# Patient Record
Sex: MALE | Race: WHITE
[De-identification: names, ages, dates, MRNs, and addresses within clinical notes are randomized per-mention and may not be internally consistent; named-entity substitution may affect disease eponyms.]

---

## 2019-06-20 ENCOUNTER — HOSPITAL ENCOUNTER (EMERGENCY)
Dept: HOSPITAL 10 - FTE | Age: 44
Discharge: HOME | End: 2019-06-20
Payer: SELF-PAY

## 2019-06-20 ENCOUNTER — HOSPITAL ENCOUNTER (EMERGENCY)
Dept: HOSPITAL 91 - FTE | Age: 44
Discharge: HOME | End: 2019-06-20
Payer: SELF-PAY

## 2019-06-20 VITALS
HEIGHT: 69 IN | WEIGHT: 174.61 LBS | HEIGHT: 69 IN | BODY MASS INDEX: 25.86 KG/M2 | WEIGHT: 174.61 LBS | BODY MASS INDEX: 25.86 KG/M2

## 2019-06-20 VITALS — HEART RATE: 65 BPM | SYSTOLIC BLOOD PRESSURE: 121 MMHG | DIASTOLIC BLOOD PRESSURE: 84 MMHG | RESPIRATION RATE: 18 BRPM

## 2019-06-20 DIAGNOSIS — S49.91XA: Primary | ICD-10-CM
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
## 2019-06-20 PROCEDURE — 73030 X-RAY EXAM OF SHOULDER: CPT

## 2019-06-20 PROCEDURE — 99283 EMERGENCY DEPT VISIT LOW MDM: CPT

## 2019-06-20 RX ADMIN — IBUPROFEN 1 MG: 600 TABLET ORAL at 19:48

## 2019-06-20 NOTE — ERD
ER Documentation


Chief Complaint


Chief Complaint





RIGHT SHOULDER PAIN S/P FALL 1 WEEK AGO. CMS INTACT.





HPI


43-year-old male presents to the emergency department complaining of right 


shoulder pain intermittent for the past 2 days.  He states he was working when 


he accidentally slipped and fell forward approximately 2 feet striking his right


shoulder onto the wall.  Pain is intermittent, 7/10 severity, not alleviated by 


any medication at home.  He denies any head injury, loss of consciousness, or 


other symptoms at this time.





ROS


All systems reviewed and are negative except as per history of present illness.





Medications


Home Meds


Active Scripts


Ibuprofen* (Motrin*) 600 Mg Tab, 600 MG PO Q6, #30 TAB


   Prov:SEGUNDO WYATT PA-C         19


Naproxen* (Naprosyn*) 500 Mg Tablet, 500 MG PO BID PRN for PAIN AND/OR 


INFLAMMATION, #30 TAB


   Prov:SEGUNDO WYATT PA-C         10/2/18





Allergies


Allergies:  


Coded Allergies:  


     No Known Allergy (Unverified , 10/2/18)





PMhx/Soc


Medical and Surgical Hx:  pt denies Medical Hx, pt denies Surgical Hx


Hx Alcohol Use:  Yes (OCCASIONAL)


Hx Substance Use:  No


Hx Tobacco Use:  No


Smoking Status:  Never smoker





FmHx


Family History:  No diabetes





Physical Exam


Vitals





Physical Exam


Const:   No acute distress


Head:   Atraumatic 


Eyes:    Normal Conjunctiva


ENT:    Normal External Ears, Nose and Mouth.


Neck:               Full range of motion. No meningismus.


Resp:   No respiratory distress.


Skin:   No petechiae or rashes


Back:   No midline or flank tenderness


Ext:    No cyanosis, or edema.  Tenderness palpation of the right posterior 


shoulder.  No crepitus.  Slightly limited range of motion secondary to pain.  


Patient is neurovascularly intact to the right upper extremity.  2+ radial 


pulses noted.


Neur:   Awake and alert


Psych:    Normal Mood and Affect


Results 24 hrs





Current Medications


 Medications
   Dose
          Sig/Rose Marie
       Start Time
   Status  Last


 (Trade)       Ordered        Route
 PRN     Stop Time              Admin
Dose


                              Reason                                Admin


 Ibuprofen
     600 mg         ONCE  ONCE
    19       DC           19


(Motrin)                      PO
            20:00
                       19:48



                                             19 20:01


[address] [address]


                        Radiology Main Line: [phone]





                            DIAGNOSTIC IMAGING REPORT





Patient: MICKEY LLOYD   : 1975   Age: 43  Sex: M               


        


       MR #:    [id number]   Acct #:   [id number]    DOS: 19 0000


Ordering MD: SEGUNDO YWATT PA-C   Location:  [facility]   Room/Bed:             


                              


                                        


PROCEDURE:  DX Shoulder


 


CLINICAL INDICATION:   43-year-old. Acute trauma. Right shoulder pain. 


 


TECHNIQUE:   2-3 views. 


 


COMPARISON:   None. 


 


FINDINGS:


Osseous structures:  Normal bone mineralization. No acute fracture. No lytic or 


blastic changes.


 


Joint space:  Intact glenohumeral joint. There is slight malalignment of the 


acromioclavicular joint.


 


Soft tissues:  No radiopaque foreign body or soft tissue gas.


 


IMPRESSION:


1. Slight malalignment of the acromioclavicular joint. This may be acute or 


chronic.


2.  No acute fracture.


 


 


RPTAT: [facility]


_____________________________________________ 


Physician Hai           Date    Time 


Electronically viewed and signed by Physician Hai on 2019 


20:36 


 


D:  2019 20:36  T:  2019 20:36


RS/





CC: SEGUNDO WYATT PA-C





[id number]








Procedures/MDM


43-year-old male presents the emergency department complaining of right shoulder


pain after fall.  X-rays show no evidence of fracture.  Street, physical exam, 


work-up most consistent with contusion of the right shoulder.


Patient's extremity symptoms have stabilized while they have been evaluated in 


the department and are appropriate for outpatient follow up.


No evidence of compartment syndrome, neurologic injury, vascular injury, open 


joint, open fracture, tendon laceration, or foreign body.


No evidence of life-threatening pathology at time of discharge.  Pt/family in 


agreement with discharge plan/diagnosis.  Pt/family advised to return 


immediately with any new or worsening symptoms.  Follow-up with primary care 


physician within the next 1-2 days.





Departure


Diagnosis:  


   Primary Impression:  


   Shoulder injury


   Encounter type:  initial encounter  Laterality:  right  Qualified Codes:  


   S49.91XA - Unspecified injury of right shoulder and upper arm, initial 


   encounter


Condition:  Fair





Additional Instructions:  


Follow up with your PCP within the next 1-3 days for a repeat evaluation. If you


require a referral to a specialist, your Primary Care Provider may be able to 


provide this for you. In most patient cases, a referral is not required. If you 


have further questions regarding this matter, please ask your Primary Care 


Provider. Return the the emergency department immediately if symptoms worsen or 


change. If you have any questions regarding medications, ask your pharmacist or 


us before you leave. If any adverse reactions,  occur while taking your 


medications, discontinue the treatment and return to the emergency department 


immediately.  If any new or worsening symptoms, uncontrolled fevers, or other 


unexplained symptoms occur, return to the emergency department immediately. Take


your medications as directed, and complete the entire course of treatment.











SEUGNDO WYATT PA-C       2019 20:15